# Patient Record
Sex: MALE | Race: WHITE | NOT HISPANIC OR LATINO | URBAN - METROPOLITAN AREA
[De-identification: names, ages, dates, MRNs, and addresses within clinical notes are randomized per-mention and may not be internally consistent; named-entity substitution may affect disease eponyms.]

---

## 2022-04-15 ENCOUNTER — TELEPHONE ENCOUNTER (OUTPATIENT)
Dept: URBAN - METROPOLITAN AREA CLINIC 72 | Facility: CLINIC | Age: 27
End: 2022-04-15

## 2022-04-18 ENCOUNTER — WEB ENCOUNTER (OUTPATIENT)
Dept: URBAN - METROPOLITAN AREA CLINIC 113 | Facility: CLINIC | Age: 27
End: 2022-04-18

## 2022-04-18 ENCOUNTER — OFFICE VISIT (OUTPATIENT)
Dept: URBAN - METROPOLITAN AREA CLINIC 113 | Facility: CLINIC | Age: 27
End: 2022-04-18
Payer: COMMERCIAL

## 2022-04-18 VITALS
RESPIRATION RATE: 20 BRPM | HEART RATE: 106 BPM | HEIGHT: 72 IN | DIASTOLIC BLOOD PRESSURE: 101 MMHG | WEIGHT: 315 LBS | BODY MASS INDEX: 42.66 KG/M2 | SYSTOLIC BLOOD PRESSURE: 159 MMHG | TEMPERATURE: 97.3 F

## 2022-04-18 DIAGNOSIS — R11.2 NAUSEA AND VOMITING, UNSPECIFIED VOMITING TYPE: ICD-10-CM

## 2022-04-18 DIAGNOSIS — R74.8 ELEVATED LIVER ENZYMES: ICD-10-CM

## 2022-04-18 DIAGNOSIS — R19.4 CHANGE IN BOWEL HABITS: ICD-10-CM

## 2022-04-18 DIAGNOSIS — Z86.19 HISTORY OF HELICOBACTER PYLORI INFECTION: ICD-10-CM

## 2022-04-18 DIAGNOSIS — F10.11 HISTORY OF ETOH ABUSE: ICD-10-CM

## 2022-04-18 DIAGNOSIS — K76.0 HEPATIC STEATOSIS: ICD-10-CM

## 2022-04-18 PROBLEM — 371434005: Status: ACTIVE | Noted: 2022-04-18

## 2022-04-18 PROCEDURE — 99243 OFF/OP CNSLTJ NEW/EST LOW 30: CPT | Performed by: INTERNAL MEDICINE

## 2022-04-18 PROCEDURE — 99203 OFFICE O/P NEW LOW 30 MIN: CPT | Performed by: INTERNAL MEDICINE

## 2022-04-18 RX ORDER — ALPRAZOLAM 0.5 MG/1
1 TABLET TABLET ORAL TWICE A DAY
Status: ACTIVE | COMMUNITY

## 2022-04-18 NOTE — HPI-TODAY'S VISIT:
26-year-old male with a history of liver disease and anxiety/depression is referred by Dr. Sami Sanabria for evaluation of nausea and vomiting.  A copy of today's visit will be forwarded to the referring provider.  Per the referral note, patient recently completed treatment for H. pylori.  He was restarted on pantoprazole 40 mg twice daily following completion and plan for labs including CBC, CMP and H. pylori breath test.  Abdominal ultrasound 3/18/2022:Indeterminate hypoechoic right renal lesion measuring 4.2 x 3.2 x 2.8 cm.  Multiphase MRI or CT with and without contrast is recommended for further evaluation.  Increased hepatic echogenicity which can be seen with hepatocellular disease including hepatic steatosis.  Labs 3/14/2022: Normal CBC, elevated glucose 112, elevated AST of 73, elevated , normal ALP, normal TB, negative HCV antibody, Negative Hepatitis A antibody IgM, negative hepatitis B core antibody, negative hepatitis A antibody total, negative hepatitis B surface antibody, normal lipase, normal TSH.  Labs 1/21/2022:Elevated H. pylori IgA antibody.   Patient complains of nausea and vomiting which has been ongoing since January and was occurring almost daily. The symptoms have markedly improved after improving his diet a month ago. He continues to experience occasional nausea though this is well controlled with prn use of Pepto Bismol. He has changed his diet after being diagnosed with a fatty liver on ultrasound. He cut out red meats and most starches and mostly consumes fish and chicken. He started the diet 2-3 weeks ago. He completed treatment for H pylori in February. He does however admit to a change in bowel habits several weeks after completion of H pylori treatment. He was having nocturnal stools 3-4 times per night. He attributes this to his diet change. His bowel movements have improved somewhat. He is having 2-4 bowel movements during the day however they are usually formed. No blood per rectum or melena. He denies abdominal pain. He does have occasional heartburn after eating fries. He is no longer on PPI therapy. He does consume excessive ETOH. He drinks a half gallon of Captain Perez 100 proof each week. This has been onoing for 2-3 years. No family history of liver disease, colon cancer, or other GI malignancy.

## 2022-04-23 LAB
ACTIN (SMOOTH MUSCLE) ANTIBODY: 8
ALBUMIN: 4.1
ALKALINE PHOSPHATASE: 44
ALPHA-1-ANTITRYPSIN, SERUM: 130
ALT (SGPT): 51
ANA DIRECT: NEGATIVE
AST (SGOT): 29
BILIRUBIN, DIRECT: 0.18
BILIRUBIN, TOTAL: 0.6
CERULOPLASMIN: 23.2
FERRITIN, SERUM: 212
IMMUNOGLOBULIN A, QN, SERUM: 174
IMMUNOGLOBULIN E, TOTAL: 220
IMMUNOGLOBULIN G, QN, SERUM: 1100
IMMUNOGLOBULIN M, QN, SERUM: 79
IRON BIND.CAP.(TIBC): 332
IRON SATURATION: 27
IRON: 90
LIVER-KIDNEY MICROSOMAL AB: 1.1
MITOCHONDRIAL (M2) ANTIBODY: <20
PHENOTYPE (PI): (no result)
PROTEIN, TOTAL: 7
UIBC: 242

## 2022-06-20 ENCOUNTER — OFFICE VISIT (OUTPATIENT)
Dept: URBAN - METROPOLITAN AREA CLINIC 113 | Facility: CLINIC | Age: 27
End: 2022-06-20
Payer: COMMERCIAL

## 2022-06-20 VITALS
HEART RATE: 133 BPM | WEIGHT: 315 LBS | HEIGHT: 72 IN | BODY MASS INDEX: 42.66 KG/M2 | TEMPERATURE: 98 F | SYSTOLIC BLOOD PRESSURE: 158 MMHG | DIASTOLIC BLOOD PRESSURE: 100 MMHG

## 2022-06-20 DIAGNOSIS — R74.8 ELEVATED LIVER ENZYMES: ICD-10-CM

## 2022-06-20 DIAGNOSIS — K76.0 HEPATIC STEATOSIS: ICD-10-CM

## 2022-06-20 DIAGNOSIS — R11.2 NAUSEA AND VOMITING, UNSPECIFIED VOMITING TYPE: ICD-10-CM

## 2022-06-20 DIAGNOSIS — Z86.19 HISTORY OF HELICOBACTER PYLORI INFECTION: ICD-10-CM

## 2022-06-20 DIAGNOSIS — F10.11 HISTORY OF ETOH ABUSE: ICD-10-CM

## 2022-06-20 PROCEDURE — 99213 OFFICE O/P EST LOW 20 MIN: CPT

## 2022-06-20 RX ORDER — ALPRAZOLAM 0.5 MG/1
1 TABLET TABLET ORAL TWICE A DAY
Status: ACTIVE | COMMUNITY

## 2022-06-20 NOTE — HPI-TODAY'S VISIT:
26-year-old male with a history of liver disease and anxiety/depression presents for follow-up.  He was last seen on 4/18/2022 as a referral for nausea and vomiting.  Symptoms were ongoing for 4 months and had markedly improved following completion of H. pylori treatment and diet change.  EGD was deferred and recent H. pylori breath test results were requested from PCP.  Regarding his history of elevated liver enzymes, this is likely due to alcoholic hepatitis and fatty liver disease given evidence of hepatic steatosis on ultrasound.  Educated the patient on alcohol cessation and diet/exercise.  Previous work-up with PCP include negative hepatitis serologies.  Please plan for further labs to rule out autoimmune and hereditary etiologies though these were suspected to be within normal limits.  He did report a change in bowel habits described as increased frequency as well as nocturnal stools following diet change.  There is mild concern for infectious colitis as patient recently completed H. pylori treatment however bowel habits changed several weeks after completion.  Nocturnal stools had resolved and bowel movements were now formed making infectious process less likely.  He was recommended a daily fiber supplement to further regulate bowels with consideration of colonoscopy if stools do not continue to normalize.  Labs 4/18/2022:Normal immunoglobulins, normal iron studies, normal ceruloplasmin, normal ferritin, negative ASMA, negative AMA, and negative alpha-1 antitrypsin with phenotype MM, negative anti-LK M, normal TB, normal ALP, normal AST, elevated ALT 51, negative JORDY.  He believes he is now addicted to alcohol. He drinks 1/5 L of 100% Spiced Rum every 3 days. He usually mixes the ETOH into a large cup with 2.5 cans of soda. He states it has been 12 hours since his last drink, and he is now having tremors. He denies diaphoresis, nausea or vomiting. He denies dizziness and confusion He does not have any appt with his PCP any time soon. He has tried a rehab program in the past which was "too Islam heavy" for him. He has considered decreasing his intake but has not. He has improved his diet by substituting certain foods for healthier alternatives. His stools have remained formed and regular. He states his H pylori breath test was negative.   4/18/22: 26-year-old male with a history of liver disease and anxiety/depression is referred by Dr. Sami Sanabria for evaluation of nausea and vomiting.  A copy of today's visit will be forwarded to the referring provider.  Per the referral note, patient recently completed treatment for H. pylori.  He was restarted on pantoprazole 40 mg twice daily following completion and plan for labs including CBC, CMP and H. pylori breath test.  Patient complains of nausea and vomiting which has been ongoing since January and was occurring almost daily. The symptoms have markedly improved after improving his diet a month ago. He continues to experience occasional nausea though this is well controlled with prn use of Pepto Bismol. He has changed his diet after being diagnosed with a fatty liver on ultrasound. He cut out red meats and most starches and mostly consumes fish and chicken. He started the diet 2-3 weeks ago. He completed treatment for H pylori in February. He does however admit to a change in bowel habits several weeks after completion of H pylori treatment. He was having nocturnal stools 3-4 times per night. He attributes this to his diet change. His bowel movements have improved somewhat. He is having 2-4 bowel movements during the day however they are usually formed. No blood per rectum or melena. He denies abdominal pain. He does have occasional heartburn after eating fries. He is no longer on PPI therapy. He does consume excessive ETOH. He drinks a half gallon of Captain Perez 100 proof each week. This has been ongoing for 2-3 years. No family history of liver disease, colon cancer, or other GI malignancy.

## 2022-06-20 NOTE — HPI-OTHER HISTORIES
Abdominal ultrasound 3/18/2022:Indeterminate hypoechoic right renal lesion measuring 4.2 x 3.2 x 2.8 cm.  Multiphase MRI or CT with and without contrast is recommended for further evaluation.  Increased hepatic echogenicity which can be seen with hepatocellular disease including hepatic steatosis.  Labs 3/14/2022: Normal CBC, elevated glucose 112, elevated AST of 73, elevated , normal ALP, normal TB, negative HCV antibody, Negative Hepatitis A antibody IgM, negative hepatitis B core antibody, negative hepatitis A antibody total, negative hepatitis B surface antibody, normal lipase, normal TSH.  Labs 1/21/2022:Elevated H. pylori IgA antibody.

## 2022-12-20 ENCOUNTER — OFFICE VISIT (OUTPATIENT)
Dept: URBAN - METROPOLITAN AREA CLINIC 113 | Facility: CLINIC | Age: 27
End: 2022-12-20
Payer: COMMERCIAL

## 2022-12-20 VITALS
BODY MASS INDEX: 42.66 KG/M2 | SYSTOLIC BLOOD PRESSURE: 138 MMHG | WEIGHT: 315 LBS | HEIGHT: 72 IN | HEART RATE: 69 BPM | DIASTOLIC BLOOD PRESSURE: 80 MMHG | TEMPERATURE: 97.4 F | RESPIRATION RATE: 20 BRPM

## 2022-12-20 DIAGNOSIS — R74.8 ELEVATED LIVER ENZYMES: ICD-10-CM

## 2022-12-20 DIAGNOSIS — Z86.19 HISTORY OF HELICOBACTER PYLORI INFECTION: ICD-10-CM

## 2022-12-20 DIAGNOSIS — R11.2 NAUSEA AND VOMITING, UNSPECIFIED VOMITING TYPE: ICD-10-CM

## 2022-12-20 DIAGNOSIS — K76.0 HEPATIC STEATOSIS: ICD-10-CM

## 2022-12-20 DIAGNOSIS — F10.11 HISTORY OF ETOH ABUSE: ICD-10-CM

## 2022-12-20 PROBLEM — 197321007: Status: ACTIVE | Noted: 2022-04-18

## 2022-12-20 PROBLEM — 371434005: Status: ACTIVE | Noted: 2022-04-18

## 2022-12-20 PROCEDURE — 99214 OFFICE O/P EST MOD 30 MIN: CPT

## 2022-12-20 RX ORDER — PAROXETINE HYDROCHLORIDE HEMIHYDRATE 40 MG/1
1 TABLET IN THE MORNING TABLET, FILM COATED ORAL ONCE A DAY
Status: ACTIVE | COMMUNITY

## 2022-12-20 RX ORDER — ALPRAZOLAM 0.5 MG/1
1 TABLET TABLET ORAL
Status: ACTIVE | COMMUNITY

## 2022-12-20 RX ORDER — ESTRADIOL 2 MG/1
1 TABLET TABLET ORAL ONCE A DAY
Status: ACTIVE | COMMUNITY

## 2022-12-20 RX ORDER — SPIRONOLACTONE 50 MG/1
1 TABLET TABLET, FILM COATED ORAL ONCE A DAY
Status: ACTIVE | COMMUNITY

## 2022-12-20 NOTE — HPI-TODAY'S VISIT:
27-year-old male presents for follow-up.  He was last seen on 6/20/2022.  Work-up for elevated liver enzymes was negative.  Likely due to alcoholic consumption and fatty liver disease given evidence of hepatic steatosis on ultrasound.  Alcohol use has worsened to the point of dependence.  Again educated the patient on alcohol cessation and diet/exercise.  Is recommended he contacts his PCP to discuss rehab/AA programs.  Advised patient to call 911 she experiences signs of withdrawal such as worsening tremors, anxiety fatigue or confusion.  His nausea and vomiting are resolved after H. pylori treatment and follow-up H. pylori breath test was negative.   He has stopped ETOH completely on 6/20. He has been craving chocolate instead. He is "feeling alright". He denies abdominal pain, nausea or vomiting. He had labs done with Dr. Sanabria last month which showed normalization of LFTs. He is not working or going to school. He does want to return to school at some point however has anxiety regarding this.  He has been trying to go on more walks. Bowel movements are fairly regular. He is trying to fatty, greasy foods including pizza. He denies any blood per rectum. He denies confusion. He denies any yellowing of eyes or skin. Denies significant heartburn or dysphagia. We still do not have an H pylori breath test result for review.   6/20/22 26-year-old male with a history of liver disease and anxiety/depression presents for follow-up.  He was last seen on 4/18/2022 as a referral for nausea and vomiting.  Symptoms were ongoing for 4 months and had markedly improved following completion of H. pylori treatment and diet change.  EGD was deferred and recent H. pylori breath test results were requested from PCP.  Regarding his history of elevated liver enzymes, this is likely due to alcoholic hepatitis and fatty liver disease given evidence of hepatic steatosis on ultrasound.  Educated the patient on alcohol cessation and diet/exercise.  Previous work-up with PCP include negative hepatitis serologies.  Please plan for further labs to rule out autoimmune and hereditary etiologies though these were suspected to be within normal limits.  He did report a change in bowel habits described as increased frequency as well as nocturnal stools following diet change.  There is mild concern for infectious colitis as patient recently completed H. pylori treatment however bowel habits changed several weeks after completion.  Nocturnal stools had resolved and bowel movements were now formed making infectious process less likely.  He was recommended a daily fiber supplement to further regulate bowels with consideration of colonoscopy if stools do not continue to normalize.  Labs 4/18/2022:Normal immunoglobulins, normal iron studies, normal ceruloplasmin, normal ferritin, negative ASMA, negative AMA, and negative alpha-1 antitrypsin with phenotype MM, negative anti-LK M, normal TB, normal ALP, normal AST, elevated ALT 51, negative JORDY.  He believes he is now addicted to alcohol. He drinks 1.5 L of 100% Spiced Rum every 3 days. He usually mixes the ETOH into a large cup with 2.5 cans of soda. He states it has been 12 hours since his last drink, and he is now having tremors. He denies diaphoresis, nausea or vomiting. He denies dizziness and confusion. He does not have any appt with his PCP any time soon. He has tried a rehab program in the past which was "too Denominational heavy" for him. He has considered decreasing his intake but has not. He has improved his diet by substituting certain foods for healthier alternatives. His stools have remained formed and regular. He states his H pylori breath test was negative.   4/18/22: 26-year-old male with a history of liver disease and anxiety/depression is referred by Dr. Sami Sanabria for evaluation of nausea and vomiting.  A copy of today's visit will be forwarded to the referring provider.  Per the referral note, patient recently completed treatment for H. pylori.  He was restarted on pantoprazole 40 mg twice daily following completion and plan for labs including CBC, CMP and H. pylori breath test.  Patient complains of nausea and vomiting which has been ongoing since January and was occurring almost daily. The symptoms have markedly improved after improving his diet a month ago. He continues to experience occasional nausea though this is well controlled with prn use of Pepto Bismol. He has changed his diet after being diagnosed with a fatty liver on ultrasound. He cut out red meats and most starches and mostly consumes fish and chicken. He started the diet 2-3 weeks ago. He completed treatment for H pylori in February. He does however admit to a change in bowel habits several weeks after completion of H pylori treatment. He was having nocturnal stools 3-4 times per night. He attributes this to his diet change. His bowel movements have improved somewhat. He is having 2-4 bowel movements during the day however they are usually formed. No blood per rectum or melena. He denies abdominal pain. He does have occasional heartburn after eating fries. He is no longer on PPI therapy. He does consume excessive ETOH. He drinks a half gallon of Captain Perez 100 proof each week. This has been ongoing for 2-3 years. No family history of liver disease, colon cancer, or other GI malignancy.

## 2023-03-01 ENCOUNTER — LAB OUTSIDE AN ENCOUNTER (OUTPATIENT)
Dept: URBAN - METROPOLITAN AREA CLINIC 113 | Facility: CLINIC | Age: 28
End: 2023-03-01

## 2023-06-20 ENCOUNTER — DASHBOARD ENCOUNTERS (OUTPATIENT)
Age: 28
End: 2023-06-20

## 2023-06-20 ENCOUNTER — WEB ENCOUNTER (OUTPATIENT)
Dept: URBAN - METROPOLITAN AREA CLINIC 113 | Facility: CLINIC | Age: 28
End: 2023-06-20

## 2023-06-20 ENCOUNTER — OFFICE VISIT (OUTPATIENT)
Dept: URBAN - METROPOLITAN AREA CLINIC 113 | Facility: CLINIC | Age: 28
End: 2023-06-20
Payer: COMMERCIAL

## 2023-06-20 VITALS
SYSTOLIC BLOOD PRESSURE: 188 MMHG | TEMPERATURE: 96.4 F | HEART RATE: 108 BPM | BODY MASS INDEX: 42.66 KG/M2 | RESPIRATION RATE: 20 BRPM | DIASTOLIC BLOOD PRESSURE: 95 MMHG | HEIGHT: 72 IN | WEIGHT: 315 LBS

## 2023-06-20 DIAGNOSIS — R74.8 ELEVATED LIVER ENZYMES: ICD-10-CM

## 2023-06-20 DIAGNOSIS — F10.11 HISTORY OF ETOH ABUSE: ICD-10-CM

## 2023-06-20 DIAGNOSIS — R11.2 NAUSEA AND VOMITING, UNSPECIFIED VOMITING TYPE: ICD-10-CM

## 2023-06-20 DIAGNOSIS — K76.0 HEPATIC STEATOSIS: ICD-10-CM

## 2023-06-20 DIAGNOSIS — Z86.19 HISTORY OF HELICOBACTER PYLORI INFECTION: ICD-10-CM

## 2023-06-20 PROCEDURE — 99214 OFFICE O/P EST MOD 30 MIN: CPT

## 2023-06-20 RX ORDER — ESTRADIOL 2 MG/1
1 TABLET TABLET ORAL ONCE A DAY
Status: ACTIVE | COMMUNITY

## 2023-06-20 RX ORDER — SPIRONOLACTONE 50 MG/1
1 TABLET TABLET, FILM COATED ORAL ONCE A DAY
Status: ACTIVE | COMMUNITY

## 2023-06-20 RX ORDER — ALPRAZOLAM 0.5 MG/1
1 TABLET TABLET ORAL
Status: ACTIVE | COMMUNITY

## 2023-06-20 RX ORDER — PAROXETINE HYDROCHLORIDE HEMIHYDRATE 40 MG/1
1 TABLET IN THE MORNING TABLET, FILM COATED ORAL ONCE A DAY
Status: ACTIVE | COMMUNITY

## 2023-06-20 NOTE — HPI-TODAY'S VISIT:
27-year-old male presents for follow-up.  He was last seen on 12/20/2022.  He has elevated liver enzymes likely due to alcohol abuse and fatty liver disease.  He had abstained from alcohol since June and liver enzymes have reportedly normalized based on labs with PCP.  He was to continue alcohol cessation.  We would repeat ultrasound in March.  Regarding his history of H. pylori, he reportedly completed treatment and had H. pylori breath testing with his PCP.  This was again requested for review. Abdominal ultrasound 3/6/2023:Increased hepatic echogenicity which can be seen with hepatocellular disease, including hepatic steatosis.  Normal gallbladder.   He remains sober from ETOH. He is 1 year sober today. He has gained 15 pounds however. He mostly consumes [employer] sandwiches. He is not very active. He denies nausea, vomiting or abdominal pain. He has occasional diarrhea when he eats dairy. His mother is currently in the hospital after hypoglycemia. She was found to have a PE as well.   12/20/22 27-year-old male presents for follow-up.  He was last seen on 6/20/2022.  Work-up for elevated liver enzymes was negative.  Likely due to alcoholic consumption and fatty liver disease given evidence of hepatic steatosis on ultrasound.  Alcohol use has worsened to the point of dependence.  Again educated the patient on alcohol cessation and diet/exercise.  Is recommended he contacts his PCP to discuss rehab/AA programs.  Advised patient to call 911 she experiences signs of withdrawal such as worsening tremors, anxiety fatigue or confusion.  His nausea and vomiting are resolved after H. pylori treatment and follow-up H. pylori breath test was negative.   He has stopped ETOH completely on 6/20. He has been craving chocolate instead. He is "feeling alright". He denies abdominal pain, nausea or vomiting. He had labs done with Dr. Sanabria last month which showed normalization of LFTs. He is not working or going to school. He does want to return to school at some point however has anxiety regarding this.  He has been trying to go on more walks. Bowel movements are fairly regular. He is trying to fatty, greasy foods including pizza. He denies any blood per rectum. He denies confusion. He denies any yellowing of eyes or skin. Denies significant heartburn or dysphagia. We still do not have an H pylori breath test result for review.   6/20/22 26-year-old male with a history of liver disease and anxiety/depression presents for follow-up.  He was last seen on 4/18/2022 as a referral for nausea and vomiting.  Symptoms were ongoing for 4 months and had markedly improved following completion of H. pylori treatment and diet change.  EGD was deferred and recent H. pylori breath test results were requested from PCP.  Regarding his history of elevated liver enzymes, this is likely due to alcoholic hepatitis and fatty liver disease given evidence of hepatic steatosis on ultrasound.  Educated the patient on alcohol cessation and diet/exercise.  Previous work-up with PCP include negative hepatitis serologies.  Please plan for further labs to rule out autoimmune and hereditary etiologies though these were suspected to be within normal limits.  He did report a change in bowel habits described as increased frequency as well as nocturnal stools following diet change.  There is mild concern for infectious colitis as patient recently completed H. pylori treatment however bowel habits changed several weeks after completion.  Nocturnal stools had resolved and bowel movements were now formed making infectious process less likely.  He was recommended a daily fiber supplement to further regulate bowels with consideration of colonoscopy if stools do not continue to normalize.  Labs 4/18/2022:Normal immunoglobulins, normal iron studies, normal ceruloplasmin, normal ferritin, negative ASMA, negative AMA, and negative alpha-1 antitrypsin with phenotype MM, negative anti-LK M, normal TB, normal ALP, normal AST, elevated ALT 51, negative JORDY.  He believes he is now addicted to alcohol. He drinks 1.5 L of 100% Spiced Rum every 3 days. He usually mixes the ETOH into a large cup with 2.5 cans of soda. He states it has been 12 hours since his last drink, and he is now having tremors. He denies diaphoresis, nausea or vomiting. He denies dizziness and confusion. He does not have any appt with his PCP any time soon. He has tried a rehab program in the past which was "too Confucianist heavy" for him. He has considered decreasing his intake but has not. He has improved his diet by substituting certain foods for healthier alternatives. His stools have remained formed and regular. He states his H pylori breath test was negative.   4/18/22: 26-year-old male with a history of liver disease and anxiety/depression is referred by Dr. Sami Sanabria for evaluation of nausea and vomiting.  A copy of today's visit will be forwarded to the referring provider.  Per the referral note, patient recently completed treatment for H. pylori.  He was restarted on pantoprazole 40 mg twice daily following completion and plan for labs including CBC, CMP and H. pylori breath test.  Patient complains of nausea and vomiting which has been ongoing since January and was occurring almost daily. The symptoms have markedly improved after improving his diet a month ago. He continues to experience occasional nausea though this is well controlled with prn use of Pepto Bismol. He has changed his diet after being diagnosed with a fatty liver on ultrasound. He cut out red meats and most starches and mostly consumes fish and chicken. He started the diet 2-3 weeks ago. He completed treatment for H pylori in February. He does however admit to a change in bowel habits several weeks after completion of H pylori treatment. He was having nocturnal stools 3-4 times per night. He attributes this to his diet change. His bowel movements have improved somewhat. He is having 2-4 bowel movements during the day however they are usually formed. No blood per rectum or melena. He denies abdominal pain. He does have occasional heartburn after eating fries. He is no longer on PPI therapy. He does consume excessive ETOH. He drinks a half gallon of Captain Perez 100 proof each week. This has been ongoing for 2-3 years. No family history of liver disease, colon cancer, or other GI malignancy.

## 2023-06-21 LAB
A/G RATIO: 1.4
ABSOLUTE BASOPHILS: 40
ABSOLUTE EOSINOPHILS: 168
ABSOLUTE LYMPHOCYTES: 2218
ABSOLUTE MONOCYTES: 871
ABSOLUTE NEUTROPHILS: 6603
ALBUMIN: 4.1
ALKALINE PHOSPHATASE: 37
ALT (SGPT): 16
AST (SGOT): 13
BASOPHILS: 0.4
BILIRUBIN, TOTAL: 0.3
BUN/CREATININE RATIO: (no result)
BUN: 11
CALCIUM: 9.4
CARBON DIOXIDE, TOTAL: 27
CHLORIDE: 103
CREATININE: 0.9
EGFR: 122
EOSINOPHILS: 1.7
GLOBULIN, TOTAL: 2.9
GLUCOSE: 88
HEMATOCRIT: 43
HEMOGLOBIN: 14.7
LYMPHOCYTES: 22.4
MCH: 28.8
MCHC: 34.2
MCV: 84.3
MONOCYTES: 8.8
MPV: 9.5
NEUTROPHILS: 66.7
PLATELET COUNT: 266
POTASSIUM: 4.4
PROTEIN, TOTAL: 7
RDW: 12.8
RED BLOOD CELL COUNT: 5.1
SODIUM: 139
WHITE BLOOD CELL COUNT: 9.9